# Patient Record
Sex: MALE | Race: WHITE | Employment: OTHER
[De-identification: names, ages, dates, MRNs, and addresses within clinical notes are randomized per-mention and may not be internally consistent; named-entity substitution may affect disease eponyms.]

---

## 2024-04-22 ENCOUNTER — HOSPITAL ENCOUNTER (OUTPATIENT)
Facility: HOSPITAL | Age: 69
Discharge: HOME | End: 2024-04-25
Payer: MEDICARE

## 2024-04-22 DIAGNOSIS — D64.9 ANEMIA, UNSPECIFIED TYPE: ICD-10-CM

## 2024-04-22 DIAGNOSIS — R79.89 ELEVATED SERUM CREATININE: ICD-10-CM

## 2024-04-22 LAB
ANION GAP SERPL CALC-SCNC: 3 MMOL/L (ref 3–18)
BASOPHILS # BLD: 0 K/UL (ref 0–0.1)
BASOPHILS NFR BLD: 0 % (ref 0–2)
BUN SERPL-MCNC: 18 MG/DL (ref 7–18)
BUN/CREAT SERPL: 15 (ref 12–20)
CALCIUM SERPL-MCNC: 8.9 MG/DL (ref 8.5–10.1)
CHLORIDE SERPL-SCNC: 112 MMOL/L (ref 100–111)
CO2 SERPL-SCNC: 27 MMOL/L (ref 21–32)
CREAT SERPL-MCNC: 1.17 MG/DL (ref 0.6–1.3)
DIFFERENTIAL METHOD BLD: ABNORMAL
EOSINOPHIL # BLD: 0.1 K/UL (ref 0–0.4)
EOSINOPHIL NFR BLD: 2 % (ref 0–5)
ERYTHROCYTE [DISTWIDTH] IN BLOOD BY AUTOMATED COUNT: 16.9 % (ref 11.6–14.5)
GLUCOSE SERPL-MCNC: 100 MG/DL (ref 74–99)
HCT VFR BLD AUTO: 36.5 % (ref 36–48)
HGB BLD-MCNC: 10.9 G/DL (ref 13–16)
IMM GRANULOCYTES # BLD AUTO: 0 K/UL (ref 0–0.04)
IMM GRANULOCYTES NFR BLD AUTO: 0 % (ref 0–0.5)
LYMPHOCYTES # BLD: 0.8 K/UL (ref 0.9–3.6)
LYMPHOCYTES NFR BLD: 15 % (ref 21–52)
MCH RBC QN AUTO: 25 PG (ref 24–34)
MCHC RBC AUTO-ENTMCNC: 29.9 G/DL (ref 31–37)
MCV RBC AUTO: 83.7 FL (ref 78–100)
MONOCYTES # BLD: 0.4 K/UL (ref 0.05–1.2)
MONOCYTES NFR BLD: 9 % (ref 3–10)
NEUTS SEG # BLD: 3.8 K/UL (ref 1.8–8)
NEUTS SEG NFR BLD: 74 % (ref 40–73)
NRBC # BLD: 0 K/UL (ref 0–0.01)
NRBC BLD-RTO: 0 PER 100 WBC
PLATELET # BLD AUTO: 266 K/UL (ref 135–420)
PMV BLD AUTO: 11.9 FL (ref 9.2–11.8)
POTASSIUM SERPL-SCNC: 4.2 MMOL/L (ref 3.5–5.5)
RBC # BLD AUTO: 4.36 M/UL (ref 4.35–5.65)
SODIUM SERPL-SCNC: 142 MMOL/L (ref 136–145)
WBC # BLD AUTO: 5.1 K/UL (ref 4.6–13.2)

## 2024-04-22 PROCEDURE — 85025 COMPLETE CBC W/AUTO DIFF WBC: CPT

## 2024-04-22 PROCEDURE — 36415 COLL VENOUS BLD VENIPUNCTURE: CPT

## 2024-04-22 PROCEDURE — 80048 BASIC METABOLIC PNL TOTAL CA: CPT

## 2024-04-22 SDOH — HEALTH STABILITY: PHYSICAL HEALTH: ON AVERAGE, HOW MANY MINUTES DO YOU ENGAGE IN EXERCISE AT THIS LEVEL?: 30 MIN

## 2024-04-22 SDOH — HEALTH STABILITY: PHYSICAL HEALTH: ON AVERAGE, HOW MANY DAYS PER WEEK DO YOU ENGAGE IN MODERATE TO STRENUOUS EXERCISE (LIKE A BRISK WALK)?: 3 DAYS

## 2024-04-22 ASSESSMENT — PATIENT HEALTH QUESTIONNAIRE - PHQ9
1. LITTLE INTEREST OR PLEASURE IN DOING THINGS: NOT AT ALL
SUM OF ALL RESPONSES TO PHQ QUESTIONS 1-9: 0
SUM OF ALL RESPONSES TO PHQ9 QUESTIONS 1 & 2: 0
2. FEELING DOWN, DEPRESSED OR HOPELESS: NOT AT ALL
SUM OF ALL RESPONSES TO PHQ QUESTIONS 1-9: 0

## 2024-04-22 ASSESSMENT — LIFESTYLE VARIABLES
HOW OFTEN DO YOU HAVE A DRINK CONTAINING ALCOHOL: MONTHLY OR LESS
HOW OFTEN DO YOU HAVE A DRINK CONTAINING ALCOHOL: 2
HOW MANY STANDARD DRINKS CONTAINING ALCOHOL DO YOU HAVE ON A TYPICAL DAY: 1 OR 2
HOW MANY STANDARD DRINKS CONTAINING ALCOHOL DO YOU HAVE ON A TYPICAL DAY: 1
HOW OFTEN DO YOU HAVE SIX OR MORE DRINKS ON ONE OCCASION: 1

## 2024-04-25 ENCOUNTER — OFFICE VISIT (OUTPATIENT)
Age: 69
End: 2024-04-25

## 2024-04-25 ENCOUNTER — OFFICE VISIT (OUTPATIENT)
Age: 69
End: 2024-04-25
Payer: MEDICARE

## 2024-04-25 VITALS
SYSTOLIC BLOOD PRESSURE: 126 MMHG | OXYGEN SATURATION: 99 % | TEMPERATURE: 98.6 F | HEART RATE: 78 BPM | BODY MASS INDEX: 21.05 KG/M2 | OXYGEN SATURATION: 99 % | DIASTOLIC BLOOD PRESSURE: 76 MMHG | HEART RATE: 78 BPM | HEIGHT: 66 IN | SYSTOLIC BLOOD PRESSURE: 126 MMHG | TEMPERATURE: 98.6 F | WEIGHT: 131 LBS | RESPIRATION RATE: 16 BRPM | HEIGHT: 66 IN | DIASTOLIC BLOOD PRESSURE: 76 MMHG | WEIGHT: 131 LBS | BODY MASS INDEX: 21.05 KG/M2 | RESPIRATION RATE: 16 BRPM

## 2024-04-25 DIAGNOSIS — I10 ESSENTIAL (PRIMARY) HYPERTENSION: ICD-10-CM

## 2024-04-25 DIAGNOSIS — C18.9 MUCINOUS ADENOCARCINOMA OF COLON (HCC): ICD-10-CM

## 2024-04-25 DIAGNOSIS — R73.01 IFG (IMPAIRED FASTING GLUCOSE): ICD-10-CM

## 2024-04-25 DIAGNOSIS — Z43.6 ATTENTION TO UROSTOMY (HCC): ICD-10-CM

## 2024-04-25 DIAGNOSIS — Z71.89 ACP (ADVANCE CARE PLANNING): ICD-10-CM

## 2024-04-25 DIAGNOSIS — G62.9 NEUROPATHY: ICD-10-CM

## 2024-04-25 DIAGNOSIS — Z00.00 MEDICARE ANNUAL WELLNESS VISIT, SUBSEQUENT: Primary | ICD-10-CM

## 2024-04-25 DIAGNOSIS — Z93.3 COLOSTOMY STATUS (HCC): ICD-10-CM

## 2024-04-25 DIAGNOSIS — G45.3 AMAUROSIS FUGAX: Primary | ICD-10-CM

## 2024-04-25 DIAGNOSIS — F51.01 PRIMARY INSOMNIA: ICD-10-CM

## 2024-04-25 LAB — HBA1C MFR BLD: 5.3 %

## 2024-04-25 PROCEDURE — 3017F COLORECTAL CA SCREEN DOC REV: CPT | Performed by: FAMILY MEDICINE

## 2024-04-25 PROCEDURE — 1123F ACP DISCUSS/DSCN MKR DOCD: CPT | Performed by: FAMILY MEDICINE

## 2024-04-25 PROCEDURE — 99497 ADVNCD CARE PLAN 30 MIN: CPT | Performed by: FAMILY MEDICINE

## 2024-04-25 PROCEDURE — 3078F DIAST BP <80 MM HG: CPT | Performed by: FAMILY MEDICINE

## 2024-04-25 PROCEDURE — 3074F SYST BP LT 130 MM HG: CPT | Performed by: FAMILY MEDICINE

## 2024-04-25 PROCEDURE — G0439 PPPS, SUBSEQ VISIT: HCPCS | Performed by: FAMILY MEDICINE

## 2024-04-25 SDOH — ECONOMIC STABILITY: FOOD INSECURITY: WITHIN THE PAST 12 MONTHS, THE FOOD YOU BOUGHT JUST DIDN'T LAST AND YOU DIDN'T HAVE MONEY TO GET MORE.: NEVER TRUE

## 2024-04-25 SDOH — ECONOMIC STABILITY: INCOME INSECURITY: HOW HARD IS IT FOR YOU TO PAY FOR THE VERY BASICS LIKE FOOD, HOUSING, MEDICAL CARE, AND HEATING?: NOT HARD AT ALL

## 2024-04-25 SDOH — ECONOMIC STABILITY: HOUSING INSECURITY
IN THE LAST 12 MONTHS, WAS THERE A TIME WHEN YOU DID NOT HAVE A STEADY PLACE TO SLEEP OR SLEPT IN A SHELTER (INCLUDING NOW)?: NO

## 2024-04-25 SDOH — ECONOMIC STABILITY: FOOD INSECURITY: WITHIN THE PAST 12 MONTHS, YOU WORRIED THAT YOUR FOOD WOULD RUN OUT BEFORE YOU GOT MONEY TO BUY MORE.: NEVER TRUE

## 2024-04-25 NOTE — ACP (ADVANCE CARE PLANNING)
Advance Care Planning     General Advance Care Planning (ACP) Conversation      Date of Conversation: 4/25/2024  Conducted with: Patient with Decision Making Capacity    Healthcare Decision Maker:     Primary Decision Maker: Virginie Samayoa - Spouse - [phone]    Secondary Decision Maker: Yao RICCI,Dez COHEN - Child - [phone]    Today we documented Decision Maker(s) consistent with ACP documents on file.    Content/Action Overview:   Has ACP document(s) on file - reflects the patient's care preferences  No changes endorsed.     Length of Voluntary ACP Conversation in minutes:  16 minutes    Itz Mathias MD

## 2024-04-25 NOTE — PROGRESS NOTES
Chief Complaint   Patient presents with    Crohn's Disease    Gastroesophageal Reflux    Hypertension    Insomnia    Other      \"Have you been to the ER, urgent care clinic since your last visit?  Hospitalized since your last visit?\"    Yes, multiple ED visit for sepsis, nephrostomy complication    “Have you seen or consulted any other health care providers outside of [facility] since your last visit?”    Yes, under the care of general surgery and gastro        “Have you had a colorectal cancer screening such as a colonoscopy/FIT/Cologuard?    NO under the care of gastro due to history of adenocarcinoma of rectum /crohn's disease    Date of last Colonoscopy: 4/21/2020  No cologuard on file  No FIT/FOBT on file   Date of last flexible sigmoidoscopy: 4/21/2020          Click Here for Release of Records Request   
mmol/L 3   Est, Glom Filt Rate >60 ml/min/1.73m2 67   Glucose, Random 74 - 99 mg/dL 100 (H)   Calcium, Total 8.5 - 10.1 MG/DL 8.9   WBC 4.6 - 13.2 K/uL 5.1   RBC 4.35 - 5.65 M/uL 4.36   Hemoglobin Quant 13.0 - 16.0 g/dL 10.9 (L)   Hematocrit 36.0 - 48.0 % 36.5   MCV 78.0 - 100.0 FL 83.7   MCH 24.0 - 34.0 PG 25.0   MCHC 31.0 - 37.0 g/dL 29.9 (L)   MPV 9.2 - 11.8 FL 11.9 (H)   RDW 11.6 - 14.5 % 16.9 (H)   Platelet Count 135 - 420 K/uL 266   Neutrophils/100 leukocytes 40 - 73 % 74 (H)   Lymphocyte % 21 - 52 % 15 (L)   Monocytes % 3 - 10 % 9   Eosinophils % 0 - 5 % 2   Basophils % 0 - 2 % 0   Neutrophils Absolute 1.8 - 8.0 K/UL 3.8   Lymphocytes Absolute 0.9 - 3.6 K/UL 0.8 (L)   Monocytes Absolute 0.05 - 1.2 K/UL 0.4   Eosinophils Absolute 0.0 - 0.4 K/UL 0.1   Basophils Absolute 0.0 - 0.1 K/UL 0.0   Differential Type -   AUTOMATED   Immature Granulocytes % 0.0 - 0.5 % 0   Nucleated Red Blood Cells 0  WBC  0.00 - 0.01 K/uL 0.0  0.00   Immature Granulocytes Absolute 0.00 - 0.04 K/UL 0.0   (H): Data is abnormally high  (L): Data is abnormally low     Latest Reference Range & Units 04/25/24 11:31   Hemoglobin A1C, POC % 5.3       ASSESSMENT / PLAN   Diagnosis Orders   1. Amaurosis fugax        2. IFG (impaired fasting glucose)  AMB POC HEMOGLOBIN A1C      3. Essential (primary) hypertension  Hepatic Function Panel    Lipid Panel      4. Primary insomnia        5. Colostomy status (HCC)        6. Attention to urostomy (HCC)        7. Mucinous adenocarcinoma of colon (HCC)        8. Neuropathy          Amaurosis fugax  - cont Lipitor and ASA.  - reviewed hospital testing which was neg for stroke    IFG  - A1c is normal.    Essential hypertension  - controlled   - cont Inderal 60mg SR daily.      Insomnia   - refills of trazodone as needed.    Mucinous adenocarcinoma of colon  Colostomy state  Urostomy complication   - Follow up with GI and oncology  - cont current care of urostomy and colostomy  - seeing urology for

## 2024-04-25 NOTE — PATIENT INSTRUCTIONS
Patient Education        Well Visit, Over 65: Care Instructions  Well visits can help you stay healthy. Your doctor has checked your overall health and may have suggested ways to take good care of yourself. Your doctor also may have recommended tests. You can help prevent illness with healthy eating, good sleep, vaccinations, regular exercise, and other steps.    Get the tests that you and your doctor decide on. Depending on your age and risks, examples might include hearing tests as well as screening for colon, breast, and lung cancer. Screening helps find diseases before any symptoms appear.   Eat healthy foods. Choose fruits, vegetables, whole grains, lean protein, and low-fat dairy foods. Limit saturated fat, and reduce salt.     Limit alcohol. Men should have no more than 2 drinks a day. Women should have no more than 1. For some people, no alcohol is the best choice.   Exercise. It can help prevent falls. Get at least 30 minutes of exercise on most days of the week. Walking, yoga, and stone chi can be good choices.     Reach and stay at your healthy weight. This will lower your risk for many health problems.   Take care of your mental health. Try to stay connected with friends, family, and community, and find ways to manage stress.     If you're feeling depressed or hopeless, talk to someone. A counselor can help. If you don't have a counselor, talk to your doctor.   Talk to your doctor if you think you may have a problem with alcohol or drug use. This includes prescription medicines and illegal drugs.     Avoid tobacco and nicotine: Don't smoke, vape, or chew. If you need help quitting, talk to your doctor.   Practice safer sex. Getting tested, using condoms or dental dams, and limiting sex partners can help prevent STIs.     Make an advance directive. This is a legal way to tell your family and doctor what you want to happen at the end of your life or when you can't speak for yourself.   Prevent problems where

## 2024-04-25 NOTE — PATIENT INSTRUCTIONS

## 2024-04-25 NOTE — PROGRESS NOTES
Chief Complaint   Patient presents with    Medicare AWV     Medicare Annual Wellness Visit    Dez Samayoa is here for Medicare AWV    Assessment & Plan   Medicare annual wellness visit, subsequent  ACP (advance care planning)  -     [place] Advanced Care Planning (16-30 minutes) [11865]  Recommendations for Preventive Services Due: see orders and patient instructions/AVS.  Recommended screening schedule for the next 5-10 years is provided to the patient in written form: see Patient Instructions/AVS.     Age and sex specific counseling.       Subjective       Patient's complete Health Risk Assessment and screening values have been reviewed and are found in Flowsheets. The following problems were reviewed today and where indicated follow up appointments were made and/or referrals ordered.    Positive Risk Factor Screenings with Interventions:    Fall Risk:  Do you feel unsteady or are you worried about falling? : (!) yes  2 or more falls in past year?: no  Fall with injury in past year?: no     Interventions:    Reviewed medications, home hazards, visual acuity, and co-morbidities that can increase risk for falls  See AVS for additional education material         Controlled Medication Review:      Today's Pain Level: Pain Score: Zero     Opioid Risk: (Low risk score <55) Opioid risk score: 44    Patient is low risk for opioid use disorder or overdose.    Last PDMP Emmaneul as Reviewed:  Review User Review Instant Review Result                  General HRA Questions:  Select all that apply: (!) New or Increased Pain, Stress    Pain Interventions:  No additional interventions besides managing his gabapentin.    Stress Interventions:  Patient comments: doing well overall.  Keeping a positive outlook.  Trying to get back to activities to decrease stress.       Activity, Diet, and Weight:  On average, how many days per week do you engage in moderate to strenuous exercise (like a brisk walk)?: 3 days  On average, how many

## 2024-04-25 NOTE — PROGRESS NOTES
Chief Complaint   Patient presents with    Medicare AWV      \"Have you been to the ER, urgent care clinic since your last visit?  Hospitalized since your last visit?\"    Yes, multiple ED visit for sepsis, nephrostomy complication    “Have you seen or consulted any other health care providers outside of [facility] since your last visit?”    Yes, under the care of general surgery and gastro        “Have you had a colorectal cancer screening such as a colonoscopy/FIT/Cologuard?    NO under the care of gastro due to history of adenocarcinoma of rectum /crohn's disease    Date of last Colonoscopy: 4/21/2020  No cologuard on file  No FIT/FOBT on file   Date of last flexible sigmoidoscopy: 4/21/2020         Click Here for Release of Records Request

## 2024-05-08 DIAGNOSIS — G62.9 NEUROPATHY: ICD-10-CM

## 2024-05-09 RX ORDER — GABAPENTIN 300 MG/1
300 CAPSULE ORAL 3 TIMES DAILY
Qty: 90 CAPSULE | Refills: 5 | Status: SHIPPED | OUTPATIENT
Start: 2024-05-09 | End: 2024-11-05

## 2024-05-28 ENCOUNTER — PATIENT MESSAGE (OUTPATIENT)
Age: 69
End: 2024-05-28

## 2024-05-28 DIAGNOSIS — R20.2 LEG PARESTHESIA: Primary | ICD-10-CM

## 2024-05-28 NOTE — TELEPHONE ENCOUNTER
From: Dez Samayoa  To: Dr. Itz Mathias  Sent: 5/28/2024 11:52 AM EDT  Subject: Neuropathy specialist?    Dr. Mathias,     I would like to explore anything that might help improve the numbness and tingling in my left leg caused by sciatic nerve damage inflicted during my cancer surgery at [facility] in [place]. Is there more you and I could talk about or could you refer a specialist that could help? Virginie has seen Dr. Moura here in [facility] but I don't know his specialty.    Thank you for your help as always,  Beka Samayoa  [phone]  [email or web address]

## 2024-06-20 RX ORDER — PROPRANOLOL HCL 60 MG
60 CAPSULE, EXTENDED RELEASE 24HR ORAL DAILY
Qty: 90 CAPSULE | Refills: 2 | Status: SHIPPED | OUTPATIENT
Start: 2024-06-20

## 2024-07-11 RX ORDER — TRAZODONE HYDROCHLORIDE 100 MG/1
TABLET ORAL
Qty: 90 TABLET | Refills: 1 | Status: SHIPPED | OUTPATIENT
Start: 2024-07-11

## 2024-07-14 SDOH — HEALTH STABILITY: PHYSICAL HEALTH: ON AVERAGE, HOW MANY MINUTES DO YOU ENGAGE IN EXERCISE AT THIS LEVEL?: 30 MIN

## 2024-07-14 SDOH — HEALTH STABILITY: PHYSICAL HEALTH: ON AVERAGE, HOW MANY DAYS PER WEEK DO YOU ENGAGE IN MODERATE TO STRENUOUS EXERCISE (LIKE A BRISK WALK)?: 4 DAYS

## 2024-07-15 NOTE — PROGRESS NOTES
[facility]  [address] [place] [address], [address]  [place] [address]  Phone: [phone]  Fax: [phone]        Dez Samayoa  : 1955  PCP: Itz Mathias MD  2024    NEW PATIENT    HISTORY OF PRESENT ILLNESS  Dez Samayoa is a 68 y.o. male c/o numbness/tingling left leg onset after AP resection 10/2023 and IR ablation nicked the sciatic nerve. Pt notes he has had multiple abdominal surgery and had his bladder removed. Pt notes he has colostomy and urostomy bag. Pt has knee pain with squats. Pt started Gabapentin 300mg TID with minimal benefit. Pt continues with HEP and walking for exercise. Pt can only use elliptical for 5-10 minutes due to knee pain.    Lumbosacral XR dated 24 was reviewed. Upon my read, Normal alignment. Disc spaces intact. No focal disc space narrowing.. Pelvic obliquity. Mild posterolisthesis L2 on L3 and L3 on L4.    Pain Score: 4/10     PmHx: hx of mucinous adenocarcinoma surgery, chemo. DVT, GERD, Chron's,       reviewed.    REVIEW OF SYSTEMS  Review of Systems   Constitutional:  Negative for fever and unexpected weight change.   HENT:  Negative for trouble swallowing.    Eyes:  Negative for visual disturbance.   Respiratory:  Negative for shortness of breath.    Cardiovascular:  Negative for chest pain and palpitations.   Gastrointestinal:  Negative for diarrhea, nausea and vomiting.   Genitourinary:  Negative for enuresis.   Musculoskeletal:  Positive for back pain.   Skin:  Negative for rash.   Neurological:  Negative for dizziness, weakness and headaches.   Psychiatric/Behavioral:  Negative for sleep disturbance. The patient is not nervous/anxious.      SPINE/MUSCULOSKELETAL EXAM  Back Exam     Range of Motion   Extension:  normal   Flexion:  normal   Rotation right:  normal   Rotation left:  normal     Tests   Straight leg raise right: negative  Straight leg raise left: negative    Reflexes   Patellar:  2/4  Achilles:

## 2024-07-17 ENCOUNTER — OFFICE VISIT (OUTPATIENT)
Age: 69
End: 2024-07-17
Payer: MEDICARE

## 2024-07-17 DIAGNOSIS — R20.0 NUMBNESS AND TINGLING OF LEFT LOWER EXTREMITY: ICD-10-CM

## 2024-07-17 DIAGNOSIS — M54.50 LUMBAR PAIN: ICD-10-CM

## 2024-07-17 DIAGNOSIS — R20.2 NUMBNESS AND TINGLING OF LEFT LOWER EXTREMITY: ICD-10-CM

## 2024-07-17 DIAGNOSIS — M54.16 LUMBAR RADICULOPATHY: Primary | ICD-10-CM

## 2024-07-17 PROCEDURE — 99204 OFFICE O/P NEW MOD 45 MIN: CPT | Performed by: PHYSICAL MEDICINE & REHABILITATION

## 2024-07-17 PROCEDURE — G8420 CALC BMI NORM PARAMETERS: HCPCS | Performed by: PHYSICAL MEDICINE & REHABILITATION

## 2024-07-17 PROCEDURE — 72100 X-RAY EXAM L-S SPINE 2/3 VWS: CPT | Performed by: PHYSICAL MEDICINE & REHABILITATION

## 2024-07-17 PROCEDURE — 1036F TOBACCO NON-USER: CPT | Performed by: PHYSICAL MEDICINE & REHABILITATION

## 2024-07-17 PROCEDURE — G8427 DOCREV CUR MEDS BY ELIG CLIN: HCPCS | Performed by: PHYSICAL MEDICINE & REHABILITATION

## 2024-07-17 PROCEDURE — 1123F ACP DISCUSS/DSCN MKR DOCD: CPT | Performed by: PHYSICAL MEDICINE & REHABILITATION

## 2024-07-17 PROCEDURE — 3017F COLORECTAL CA SCREEN DOC REV: CPT | Performed by: PHYSICAL MEDICINE & REHABILITATION

## 2024-07-17 RX ORDER — GABAPENTIN 600 MG/1
600 TABLET ORAL 3 TIMES DAILY
Qty: 270 TABLET | Refills: 1 | Status: SHIPPED | OUTPATIENT
Start: 2024-07-17 | End: 2025-01-13

## 2024-07-17 ASSESSMENT — ENCOUNTER SYMPTOMS
DIARRHEA: 0
NAUSEA: 0
VOMITING: 0
TROUBLE SWALLOWING: 0
SHORTNESS OF BREATH: 0
BACK PAIN: 1

## 2024-07-18 ENCOUNTER — TELEPHONE (OUTPATIENT)
Age: 69
End: 2024-07-18

## 2024-07-18 NOTE — TELEPHONE ENCOUNTER
I called and spoke to the pt. The pt was identified using 2 pt identifiers. He was asked how long he was on the elliptical. He reports being on the elliptical for 20 minutes. This is longer than he is used to, according to his OV note from 07/17/24. I asked him when the pain started. He confirmed that the pain came after he got off the elliptical and after he took the extra gabapentin. The pt then went on to mention that he has a fever of 101.7. I explained to the pt that this can cause his nausea. The pt has a urostomy and there is some concern for an infection based on the location of his pain. I did inform Dr. Avery of this and the pt's reported symptoms. He states that the pt needs to either go to the ER or urgent care to make sure he does not have an infection in his kidneys. I notified the pt of Dr. Avery's recommendation. He verbalized understanding and has no questions at this time.

## 2024-07-18 NOTE — TELEPHONE ENCOUNTER
Patient called today, states he took his new recommended double dose of his Gabapentin yesterday afternoon and worked out on the eliptical. States he is having nausea and severe pain around his right kidney area, that has turned into more of a constant throbbing pain. He did not take a dose last night or this morning due to the nausea and pain.    Please review and advise patient, [phone]

## 2024-07-26 ENCOUNTER — HOSPITAL ENCOUNTER (OUTPATIENT)
Facility: HOSPITAL | Age: 69
End: 2024-07-26
Attending: PHYSICAL MEDICINE & REHABILITATION
Payer: MEDICARE

## 2024-07-26 DIAGNOSIS — R20.2 NUMBNESS AND TINGLING OF LEFT LOWER EXTREMITY: ICD-10-CM

## 2024-07-26 DIAGNOSIS — M54.16 LUMBAR RADICULOPATHY: ICD-10-CM

## 2024-07-26 DIAGNOSIS — M54.50 LUMBAR PAIN: ICD-10-CM

## 2024-07-26 DIAGNOSIS — R20.0 NUMBNESS AND TINGLING OF LEFT LOWER EXTREMITY: ICD-10-CM

## 2024-07-26 PROCEDURE — 6360000004 HC RX CONTRAST MEDICATION: Performed by: PHYSICAL MEDICINE & REHABILITATION

## 2024-07-26 PROCEDURE — 72158 MRI LUMBAR SPINE W/O & W/DYE: CPT

## 2024-07-26 PROCEDURE — A9577 INJ MULTIHANCE: HCPCS | Performed by: PHYSICAL MEDICINE & REHABILITATION

## 2024-07-26 RX ADMIN — GADOBENATE DIMEGLUMINE 13 ML: 529 INJECTION, SOLUTION INTRAVENOUS at 17:38

## 2024-07-29 ENCOUNTER — TELEPHONE (OUTPATIENT)
Age: 69
End: 2024-07-29

## 2024-07-29 NOTE — TELEPHONE ENCOUNTER
Patient called for  and said that he had the mri of the Lower Lumbar done on 7/26/24 at [facility].     Patient is asking if their is anyway  could work him in to be seen this week. That he has the mri disc.     Patient  tel. [phone].

## 2024-07-29 NOTE — TELEPHONE ENCOUNTER
As of right now the MRI Lumbar spine has not been read by the radiologist. We can keep an eye out for the results and we will try to get him in sooner.

## 2024-08-02 NOTE — TELEPHONE ENCOUNTER
Called patient [phone] and verified his name and date of birth. I informed him that I will monitor Dr. Avery schedule for any cancellations next and I will give him a call. I inquired how much time notice would he need for an appointment. He stated he would need a couple of hours. I informed him that I will call once I get an opening. Patient verbalized understanding.

## 2024-08-05 NOTE — TELEPHONE ENCOUNTER
Called patient [phone] and verified his name and date of birth. I offered the patient an appointment for Wednesday at 1045 am with Dr. Avery. Patient accepted and appointment was made. No further action needed at this time.

## 2024-08-06 ENCOUNTER — HOSPITAL ENCOUNTER (OUTPATIENT)
Facility: HOSPITAL | Age: 69
Discharge: HOME | End: 2024-08-09
Payer: MEDICARE

## 2024-08-06 DIAGNOSIS — I10 ESSENTIAL (PRIMARY) HYPERTENSION: ICD-10-CM

## 2024-08-06 LAB
ALBUMIN SERPL-MCNC: 3.5 G/DL (ref 3.4–5)
ALBUMIN/GLOB SERPL: 0.9 (ref 0.8–1.7)
ALP SERPL-CCNC: 77 U/L (ref 45–117)
ALT SERPL-CCNC: 44 U/L (ref 16–61)
AST SERPL-CCNC: 20 U/L (ref 10–38)
BILIRUB DIRECT SERPL-MCNC: 0.2 MG/DL (ref 0–0.2)
BILIRUB SERPL-MCNC: 0.7 MG/DL (ref 0.2–1)
CHOLEST SERPL-MCNC: 80 MG/DL
GLOBULIN SER CALC-MCNC: 4.1 G/DL (ref 2–4)
HDLC SERPL-MCNC: 26 MG/DL (ref 40–60)
HDLC SERPL: 3.1 (ref 0–5)
INR PPP: 1.1 (ref 0.9–1.1)
LDLC SERPL CALC-MCNC: 23.2 MG/DL (ref 0–100)
LIPID PANEL: ABNORMAL
PROT SERPL-MCNC: 7.6 G/DL (ref 6.4–8.2)
PROTHROMBIN TIME: 14.2 SEC (ref 11.9–14.9)
TRIGL SERPL-MCNC: 154 MG/DL
VLDLC SERPL CALC-MCNC: 30.8 MG/DL

## 2024-08-06 PROCEDURE — 80076 HEPATIC FUNCTION PANEL: CPT

## 2024-08-06 PROCEDURE — 36415 COLL VENOUS BLD VENIPUNCTURE: CPT

## 2024-08-06 PROCEDURE — 80061 LIPID PANEL: CPT

## 2024-08-06 PROCEDURE — 85610 PROTHROMBIN TIME: CPT

## 2024-08-06 NOTE — PROGRESS NOTES
[facility]  [facility] [facility], [address]  [place] [address]  Phone: [phone]  Fax: [phone]      Dez Samayoa  : 1955  PCP: Itz Mathias MD  2024    PROGRESS NOTE    HISTORY OF PRESENT ILLNESS    24  C/o numbness/tingling left leg onset after AP resection 10/2023 and IR ablation nicked the sciatic nerve.   Pt notes he has had multiple abdominal surgery and had his bladder removed and currently has a colostomy and urostomy bag.   Pt has knee pain with squats.   Pt started Gabapentin 300mg TID with minimal benefit.   Pt continues with HEP and walking for exercise.   Pt can only use elliptical for 5-10 minutes due to knee pain.  Lumbosacral XR dated 24 was reviewed. Upon my read, Normal alignment. Disc spaces intact. No focal disc space narrowing.. Pelvic obliquity. Mild posterolisthesis L2 on L3 and L3 on L4.  PLAN:  Gabapentin to 600mg TID; Lumbar MRI w/ contrast    Dez Samayoa is a 68 y.o. male was seen today for follow up. Pt notes he had muscle flap completed by Wilfrido Diaz MD, [facility]. Pt regularly walks around 1 miles. Pt notes he has been able to play golf but has decreased performance with fatigue.     Lumbar MRI images dated 24 were reviewed. Per report, Bilateral sacral nerve roots coursing through what appears to be presacral soft tissue inflammatory scarring changes; secondary sacral nerve root impingement with caliber change. Residual/recurrent presacral neoplastic process not excluded. Lumbar spondylosis as described without high-grade central canal or foraminal narrowing. Fatty marrow replacement of sacral elements; suspect post radiation changes.      Pain Score:   10    PmHx: hx of mucinous adenocarcinoma surgery, chemo. DVT, GERD, Chron's,     reviewed.    REVIEW OF SYSTEMS  Review of Systems   Constitutional:  Negative for fever and unexpected weight change.   HENT:  Negative for trouble

## 2024-08-07 ENCOUNTER — OFFICE VISIT (OUTPATIENT)
Age: 69
End: 2024-08-07
Payer: MEDICARE

## 2024-08-07 VITALS
BODY MASS INDEX: 20.89 KG/M2 | HEIGHT: 66 IN | HEART RATE: 77 BPM | TEMPERATURE: 98.1 F | WEIGHT: 130 LBS | OXYGEN SATURATION: 98 % | DIASTOLIC BLOOD PRESSURE: 76 MMHG | SYSTOLIC BLOOD PRESSURE: 120 MMHG

## 2024-08-07 DIAGNOSIS — M54.17 LUMBOSACRAL NEURITIS: Primary | ICD-10-CM

## 2024-08-07 DIAGNOSIS — R20.0 NUMBNESS AND TINGLING OF LEFT LOWER EXTREMITY: ICD-10-CM

## 2024-08-07 DIAGNOSIS — M54.16 LUMBAR RADICULOPATHY: ICD-10-CM

## 2024-08-07 DIAGNOSIS — M54.50 LUMBAR PAIN: ICD-10-CM

## 2024-08-07 DIAGNOSIS — R20.2 NUMBNESS AND TINGLING OF LEFT LOWER EXTREMITY: ICD-10-CM

## 2024-08-07 PROCEDURE — 3074F SYST BP LT 130 MM HG: CPT | Performed by: PHYSICAL MEDICINE & REHABILITATION

## 2024-08-07 PROCEDURE — 1123F ACP DISCUSS/DSCN MKR DOCD: CPT | Performed by: PHYSICAL MEDICINE & REHABILITATION

## 2024-08-07 PROCEDURE — 99214 OFFICE O/P EST MOD 30 MIN: CPT | Performed by: PHYSICAL MEDICINE & REHABILITATION

## 2024-08-07 PROCEDURE — G8420 CALC BMI NORM PARAMETERS: HCPCS | Performed by: PHYSICAL MEDICINE & REHABILITATION

## 2024-08-07 PROCEDURE — G8427 DOCREV CUR MEDS BY ELIG CLIN: HCPCS | Performed by: PHYSICAL MEDICINE & REHABILITATION

## 2024-08-07 PROCEDURE — 3017F COLORECTAL CA SCREEN DOC REV: CPT | Performed by: PHYSICAL MEDICINE & REHABILITATION

## 2024-08-07 PROCEDURE — 3078F DIAST BP <80 MM HG: CPT | Performed by: PHYSICAL MEDICINE & REHABILITATION

## 2024-08-07 PROCEDURE — 1036F TOBACCO NON-USER: CPT | Performed by: PHYSICAL MEDICINE & REHABILITATION

## 2024-08-07 ASSESSMENT — ENCOUNTER SYMPTOMS
DIARRHEA: 0
TROUBLE SWALLOWING: 0
BACK PAIN: 1
NAUSEA: 0
VOMITING: 0
SHORTNESS OF BREATH: 0

## 2024-08-07 NOTE — PROGRESS NOTES
Dez Samayoa presents today for   Chief Complaint   Patient presents with    Follow-up     FOLLOW UP AND MRI RESULTS       Is someone accompanying this pt? YES    Is the patient using any DME equipment during OV? NO    Depression Screenin/22/2024     1:09 PM 2024    10:52 AM 2023    11:06 AM 2022     9:20 AM 10/18/2021    10:10 AM 2021     1:51 PM 2021    10:13 AM   PHQ-9 Questionaire   Little interest or pleasure in doing things 0 0 0 0 0 0 0   Feeling down, depressed, or hopeless 0 0 0 0 0 0 0   Trouble falling or staying asleep, or sleeping too much   0       Feeling tired or having little energy   0       Poor appetite or overeating   0       Feeling bad about yourself - or that you are a failure or have let yourself or your family down   0       Trouble concentrating on things, such as reading the newspaper or watching television   0       Moving or speaking so slowly that other people could have noticed. Or the opposite - being so fidgety or restless that you have been moving around a lot more than usual   0       Thoughts that you would be better off dead, or of hurting yourself in some way   0       PHQ-9 Total Score 0 0 0 0 0 0 0   If you checked off any problems, how difficult have these problems made it for you to do your work, take care of things at home, or get along with other people?   0             2024     1:09 PM 2024    10:52 AM 2023    11:06 AM 2022     9:20 AM 10/18/2021    10:10 AM 2021     1:51 PM 2021    10:13 AM   PHQ Scores   PHQ2 Score 0 0 0 0 0 0 0   PHQ2 Score    0 0 0 0   PHQ9 Score 0 0 0 0 0 0 0       Learning Assessment:  No question data found.    Abuse Screenin/25/2024    10:00 AM 2024    10:00 AM 2023    11:00 AM   AMB Abuse Screening   Do you ever feel afraid of your partner? N N N   Are you in a relationship with someone who physically or mentally threatens you? N N N   Is it safe for you to go

## 2024-08-08 RX ORDER — ATORVASTATIN CALCIUM 40 MG/1
40 TABLET, FILM COATED ORAL NIGHTLY
Qty: 90 TABLET | Refills: 3 | Status: SHIPPED | OUTPATIENT
Start: 2024-08-08

## 2024-08-25 RX ORDER — ASPIRIN 81 MG/1
TABLET, CHEWABLE ORAL
Qty: 90 TABLET | Refills: 2 | Status: SHIPPED | OUTPATIENT
Start: 2024-08-25

## 2024-08-25 RX ORDER — OMEPRAZOLE 20 MG/1
CAPSULE, DELAYED RELEASE ORAL
Qty: 90 CAPSULE | Refills: 3 | Status: SHIPPED | OUTPATIENT
Start: 2024-08-25

## 2024-10-08 ENCOUNTER — TELEPHONE (OUTPATIENT)
Age: 69
End: 2024-10-08

## 2024-10-08 NOTE — TELEPHONE ENCOUNTER
Left voice message on 10/8/2024 at 4:48pm for patient to call me back and schedule spine injection order on 10/1/2024 by Dr. Avery.

## 2024-10-09 ENCOUNTER — TELEPHONE (OUTPATIENT)
Facility: CLINIC | Age: 69
End: 2024-10-09

## 2024-10-09 NOTE — TELEPHONE ENCOUNTER
Patient needs DMV form MED 10 for Disabled parking placard renewal completed.   Patient states Dr. Mathias completed the original for him back in around July. I was unable to find completed form in chart. MR. Samayoa did sign and complete his portion of the form and it was put in provider's inbox for MA review. Patient aware this will be taken care of upon Dr. Mathias's return next week.     Tomeka expires on 10/26 (copy attached to paperwork)

## 2024-10-22 ENCOUNTER — HOSPITAL ENCOUNTER (OUTPATIENT)
Facility: HOSPITAL | Age: 69
Discharge: HOME | End: 2024-10-25
Payer: MEDICARE

## 2024-10-22 VITALS
TEMPERATURE: 98.2 F | OXYGEN SATURATION: 99 % | DIASTOLIC BLOOD PRESSURE: 87 MMHG | RESPIRATION RATE: 17 BRPM | SYSTOLIC BLOOD PRESSURE: 151 MMHG | HEART RATE: 77 BPM

## 2024-10-22 PROCEDURE — 6360000002 HC RX W HCPCS: Performed by: PHYSICAL MEDICINE & REHABILITATION

## 2024-10-22 PROCEDURE — 6370000000 HC RX 637 (ALT 250 FOR IP): Performed by: PHYSICAL MEDICINE & REHABILITATION

## 2024-10-22 PROCEDURE — 64999 UNLISTED PX NERVOUS SYSTEM: CPT | Performed by: PHYSICAL MEDICINE & REHABILITATION

## 2024-10-22 PROCEDURE — 64450 NJX AA&/STRD OTHER PN/BRANCH: CPT | Performed by: PHYSICAL MEDICINE & REHABILITATION

## 2024-10-22 PROCEDURE — 2500000003 HC RX 250 WO HCPCS: Performed by: PHYSICAL MEDICINE & REHABILITATION

## 2024-10-22 RX ORDER — DIAZEPAM 5 MG/1
10 TABLET ORAL ONCE
Status: COMPLETED | OUTPATIENT
Start: 2024-10-22 | End: 2024-10-22

## 2024-10-22 RX ORDER — DIAZEPAM 5 MG/1
2.5 TABLET ORAL ONCE
Status: COMPLETED | OUTPATIENT
Start: 2024-10-22 | End: 2024-10-22

## 2024-10-22 RX ORDER — DEXAMETHASONE SODIUM PHOSPHATE 10 MG/ML
10 INJECTION, SOLUTION INTRAMUSCULAR; INTRAVENOUS ONCE
Status: COMPLETED | OUTPATIENT
Start: 2024-10-22 | End: 2024-10-22

## 2024-10-22 RX ORDER — DIAZEPAM 5 MG/1
5 TABLET ORAL ONCE
Status: COMPLETED | OUTPATIENT
Start: 2024-10-22 | End: 2024-10-22

## 2024-10-22 RX ORDER — LIDOCAINE HYDROCHLORIDE 10 MG/ML
30 INJECTION, SOLUTION EPIDURAL; INFILTRATION; INTRACAUDAL; PERINEURAL ONCE
Status: COMPLETED | OUTPATIENT
Start: 2024-10-22 | End: 2024-10-22

## 2024-10-22 RX ORDER — IOPAMIDOL 408 MG/ML
4 INJECTION, SOLUTION INTRATHECAL
Status: DISCONTINUED | OUTPATIENT
Start: 2024-10-22 | End: 2024-10-26 | Stop reason: HOSPADM

## 2024-10-22 RX ADMIN — LIDOCAINE HYDROCHLORIDE 15 ML: 10 INJECTION, SOLUTION EPIDURAL; INFILTRATION; INTRACAUDAL; PERINEURAL at 16:18

## 2024-10-22 RX ADMIN — DIAZEPAM 5 MG: 5 TABLET ORAL at 15:50

## 2024-10-22 RX ADMIN — DEXAMETHASONE SODIUM PHOSPHATE 10 MG: 10 INJECTION INTRAMUSCULAR; INTRAVENOUS at 16:21

## 2024-10-22 ASSESSMENT — PAIN DESCRIPTION - DESCRIPTORS: DESCRIPTORS: ACHING;DULL;SHARP

## 2024-10-22 ASSESSMENT — PAIN SCALES - GENERAL: PAINLEVEL_OUTOF10: 3

## 2024-10-22 ASSESSMENT — PAIN - FUNCTIONAL ASSESSMENT: PAIN_FUNCTIONAL_ASSESSMENT: 0-10

## 2024-10-22 NOTE — H&P
Office Visit  2024  [facility]       Festus Avery MD  Physical Medicine and Rehab Lumbosacral neuritis +3 more  Dx Follow-up   Reason for Visit     Progress Notes  Festus Avery MD (Physician)  Physical Medicine and Rehab  Expand All Collapse All      [facility]  [address] [facility], [address]  [place] [address]  Phone: [phone]  Fax: [phone]        Dez Samayoa  : 1955  PCP: Itz Mathias MD  2024     PROGRESS NOTE     HISTORY OF PRESENT ILLNESS     24  C/o numbness/tingling left leg onset after AP resection 10/2023 and IR ablation nicked the sciatic nerve.   Pt notes he has had multiple abdominal surgery and had his bladder removed and currently has a colostomy and urostomy bag.   Pt has knee pain with squats.   Pt started Gabapentin 300mg TID with minimal benefit.   Pt continues with HEP and walking for exercise.   Pt can only use elliptical for 5-10 minutes due to knee pain.  Lumbosacral XR dated 24 was reviewed. Upon my read, Normal alignment. Disc spaces intact. No focal disc space narrowing.. Pelvic obliquity. Mild posterolisthesis L2 on L3 and L3 on L4.  PLAN:  Gabapentin to 600mg TID; Lumbar MRI w/ contrast     Dez Samayoa is a 68 y.o. male was seen today for follow up. Pt notes he had muscle flap completed by Wilfrido Diaz MD, [facility]. Pt regularly walks around 1 miles. Pt notes he has been able to play golf but has decreased performance with fatigue.      Lumbar MRI images dated 24 were reviewed. Per report, Bilateral sacral nerve roots coursing through what appears to be presacral soft tissue inflammatory scarring changes; secondary sacral nerve root impingement with caliber change. Residual/recurrent presacral neoplastic process not excluded. Lumbar spondylosis as described without high-grade central canal or foraminal narrowing. Fatty marrow replacement of

## 2024-10-22 NOTE — INTERVAL H&P NOTE
Update History & Physical    The patient's History and Physical of August 7, 2024 was reviewed.  I recently discussed this patient's MRI findings with Dr. Avery.  I wanted a follow-up study to verify that he had no evidence of malignancy in this presacral region.  He had CT abdomen and pelvis with contrast on 8/14/2024.  This shows some presacral stranding without any aggressive osseous abnormalities.  No evidence of malignancy or tumor in the presacral region.  Okay to proceed as scheduled.   The surgical site was confirmed by the patient and me.     Plan: The risks, benefits, expected outcome, and alternative to the recommended procedure have been discussed with the patient. Patient understands and wants to proceed with the procedure.     Electronically signed by VU BABIN MD on 10/22/2024 at 4:09 PM

## 2024-10-22 NOTE — PROCEDURES
PROCEDURE NOTE  Date: 10/22/2024   Name: Dez Samayoa  YOB: 1955    Procedures              Patient Name: Dez Samayoa  Date of Procedure: October 22, 2024  Preoperative Diagnosis:  Pelvic Pain  Post Operative Diagnosis:  Pelvic Pain  Location:  [place], [place]     PROCEDURE:  Ganglion of Ángel (Impar) Block under Fluoroscopy     PROCEDURE:  After obtaining written informed consent patient was taken to the procedure room.   Pre-procedure blood pressure and pulse were stable and recorded in patients clinic chart.      The patient was brought to the procedure room and placed in a prone position with a pillow support under the pelvis.      The patient back was prepped with antiseptic solution and draped in the usual sterile fashion.  The Sacrococcygeal junction was identified using fluoroscopic guidance.      Under lateral projection, skin infiltration with 3 ml of 1% Lidocaine was performed at the needle entry site,  A 22 gauge, 3 ½  inch needle was advanced perpendicular until the needle passed through the  Sacrococcygeal junction, once the tip was behind the body of the sacrum .  No paraesthesia were noted.        After negative needle aspiration,  a mixture 10 mg of preservative free Dexamethasone (10mg/ml) and 1 cc of Lidocaine 1% was slowly injected.    The needle was removed, and a 2x2 and paper tape applied.  The patient tolerated it well. There was no evidence of somatic blockade.     Following the procedure the patient's vital signs were stable. The patient was discharged home in good condition after being given discharge instructions.    Discussion: The patient tolerated the procedure well. Patient reported marsha-procedural pain on Visual Analog Scale:  pre-5; post-3.                                              VU BABIN MD  October 22, 2024

## 2024-11-26 NOTE — PROGRESS NOTES
[facility]  [facility], [address]  [place] [address]  Phone: [phone]  Fax: [phone]      Dez Samayoa  : 1955  PCP: Itz Mathias MD  2024    PROGRESS NOTE    HISTORY OF PRESENT ILLNESS    24  C/o numbness/tingling left leg onset after AP resection 10/2023 and IR ablation nicked the sciatic nerve.   Pt notes he has had multiple abdominal surgery and had his bladder removed and currently has a colostomy and urostomy bag.   Pt has knee pain with squats.   Pt started Gabapentin 300mg TID with minimal benefit.   Pt continues with HEP and walking for exercise.   Pt can only use elliptical for 5-10 minutes due to knee pain.  Lumbosacral XR dated 24 was reviewed. Upon my read, Normal alignment. Disc spaces intact. No focal disc space narrowing.. Pelvic obliquity. Mild posterolisthesis L2 on L3 and L3 on L4.  PLAN:  Gabapentin to 600mg TID; Lumbar MRI w/ contrast    24  Pt notes he had muscle flap completed by Wilfrido Diaz MD, [facility]. Pt regularly walks around 1 miles.   Pt notes he has been able to play golf but has decreased performance with fatigue.   Lumbar MRI images dated 24 were reviewed. Per report, Bilateral sacral nerve roots coursing through what appears to be presacral soft tissue inflammatory scarring changes; secondary sacral nerve root impingement with caliber change. Residual/recurrent presacral neoplastic process not excluded. Lumbar spondylosis as described without high-grade central canal or foraminal narrowing. Fatty marrow replacement of sacral elements; suspect post radiation changes.   PLAN: Ganglion Impar Block    Dez Samayoa is a 69 y.o. male was seen today for follow up. Pt underwent Ganglion of Ángel (Impar) Block (10/22/24, Dr. Dillard) with minimal benefit. Pt notes a limited walking tolerance of 1 hour. Pt notes hilly walks increased pain. Pt continues with numbness/tingling in left  0025

## 2024-12-04 ENCOUNTER — OFFICE VISIT (OUTPATIENT)
Age: 69
End: 2024-12-04
Payer: MEDICARE

## 2024-12-04 VITALS
HEART RATE: 71 BPM | WEIGHT: 138.6 LBS | TEMPERATURE: 98.2 F | RESPIRATION RATE: 16 BRPM | BODY MASS INDEX: 22.28 KG/M2 | HEIGHT: 66 IN

## 2024-12-04 DIAGNOSIS — R29.898 LEFT LEG WEAKNESS: ICD-10-CM

## 2024-12-04 DIAGNOSIS — R20.0 NUMBNESS AND TINGLING OF LEFT LOWER EXTREMITY: ICD-10-CM

## 2024-12-04 DIAGNOSIS — R20.2 NUMBNESS AND TINGLING OF LEFT LOWER EXTREMITY: ICD-10-CM

## 2024-12-04 DIAGNOSIS — M54.16 LUMBAR RADICULOPATHY: ICD-10-CM

## 2024-12-04 DIAGNOSIS — M54.17 LUMBOSACRAL NEURITIS: Primary | ICD-10-CM

## 2024-12-04 DIAGNOSIS — M54.50 LUMBAR PAIN: ICD-10-CM

## 2024-12-04 PROCEDURE — G8484 FLU IMMUNIZE NO ADMIN: HCPCS | Performed by: PHYSICAL MEDICINE & REHABILITATION

## 2024-12-04 PROCEDURE — 1123F ACP DISCUSS/DSCN MKR DOCD: CPT | Performed by: PHYSICAL MEDICINE & REHABILITATION

## 2024-12-04 PROCEDURE — 99213 OFFICE O/P EST LOW 20 MIN: CPT | Performed by: PHYSICAL MEDICINE & REHABILITATION

## 2024-12-04 PROCEDURE — 1125F AMNT PAIN NOTED PAIN PRSNT: CPT | Performed by: PHYSICAL MEDICINE & REHABILITATION

## 2024-12-04 PROCEDURE — 3017F COLORECTAL CA SCREEN DOC REV: CPT | Performed by: PHYSICAL MEDICINE & REHABILITATION

## 2024-12-04 PROCEDURE — 1160F RVW MEDS BY RX/DR IN RCRD: CPT | Performed by: PHYSICAL MEDICINE & REHABILITATION

## 2024-12-04 PROCEDURE — G8420 CALC BMI NORM PARAMETERS: HCPCS | Performed by: PHYSICAL MEDICINE & REHABILITATION

## 2024-12-04 PROCEDURE — 1159F MED LIST DOCD IN RCRD: CPT | Performed by: PHYSICAL MEDICINE & REHABILITATION

## 2024-12-04 PROCEDURE — 1036F TOBACCO NON-USER: CPT | Performed by: PHYSICAL MEDICINE & REHABILITATION

## 2024-12-04 PROCEDURE — G8427 DOCREV CUR MEDS BY ELIG CLIN: HCPCS | Performed by: PHYSICAL MEDICINE & REHABILITATION

## 2024-12-04 ASSESSMENT — ENCOUNTER SYMPTOMS
DIARRHEA: 0
SHORTNESS OF BREATH: 0
VOMITING: 0
TROUBLE SWALLOWING: 0
NAUSEA: 0
BACK PAIN: 1

## 2024-12-18 ENCOUNTER — TELEPHONE (OUTPATIENT)
Facility: CLINIC | Age: 69
End: 2024-12-18

## 2024-12-18 NOTE — TELEPHONE ENCOUNTER
Incoming Call received by [facility] spoken to Po. Mr. Dez Samayoa is having Surgery a nair exchange. Needs Dr. Mathias to hold aspirin starting on December 20 th for seven days. They will be faxing us a form

## 2025-02-03 ENCOUNTER — TELEPHONE (OUTPATIENT)
Facility: CLINIC | Age: 70
End: 2025-02-03

## 2025-02-03 SDOH — ECONOMIC STABILITY: INCOME INSECURITY: IN THE LAST 12 MONTHS, WAS THERE A TIME WHEN YOU WERE NOT ABLE TO PAY THE MORTGAGE OR RENT ON TIME?: NO

## 2025-02-03 SDOH — ECONOMIC STABILITY: FOOD INSECURITY: WITHIN THE PAST 12 MONTHS, THE FOOD YOU BOUGHT JUST DIDN'T LAST AND YOU DIDN'T HAVE MONEY TO GET MORE.: NEVER TRUE

## 2025-02-03 SDOH — ECONOMIC STABILITY: TRANSPORTATION INSECURITY
IN THE PAST 12 MONTHS, HAS THE LACK OF TRANSPORTATION KEPT YOU FROM MEDICAL APPOINTMENTS OR FROM GETTING MEDICATIONS?: NO

## 2025-02-03 SDOH — ECONOMIC STABILITY: FOOD INSECURITY: WITHIN THE PAST 12 MONTHS, YOU WORRIED THAT YOUR FOOD WOULD RUN OUT BEFORE YOU GOT MONEY TO BUY MORE.: NEVER TRUE

## 2025-02-03 SDOH — ECONOMIC STABILITY: TRANSPORTATION INSECURITY
IN THE PAST 12 MONTHS, HAS LACK OF TRANSPORTATION KEPT YOU FROM MEETINGS, WORK, OR FROM GETTING THINGS NEEDED FOR DAILY LIVING?: NO

## 2025-02-03 NOTE — TELEPHONE ENCOUNTER
Care Transitions Initial Follow Up Call    Outreach made within 2 business days of discharge: Yes    Patient: Dez Samayoa Patient : 1955   MRN: [id number]  Reason for Admission: UTI, ureteral stricture and CARLY from 2025 to 2025   Discharge Date: 2025       Spoke with: Mr. Dez Samayoa    Discharge department/facility: [facility] Interactive Patient Contact:  Was patient able to fill all prescriptions: Yes  Was patient instructed to bring all medications to the follow-up visit: Yes  Is patient taking all medications as directed in the discharge summary? Has concerns about why Eliquis 5 mg was prescribed   Does patient understand their discharge instructions: Yes  Does patient have questions or concerns that need addressed prior to 7-14 day follow up office visit: yes - No    Additional needs identified to be addressed with provider  Has concerns about why Eliquis 5 mg was prescribed (he has not picked up from the pharmacy.              Scheduled appointment with PCP within 7-14 days    Follow Up  Future Appointments   Date Time Provider Department Center   2025  9:00 AM Itz Mathias MD [facility]   3/18/2025 10:00 AM Sia Nieves MD [facility]   2025 10:15 AM Itz Mathias MD [facility]       Emilee Short

## 2025-02-04 ENCOUNTER — OFFICE VISIT (OUTPATIENT)
Facility: CLINIC | Age: 70
End: 2025-02-04

## 2025-02-04 VITALS
DIASTOLIC BLOOD PRESSURE: 80 MMHG | HEIGHT: 66 IN | BODY MASS INDEX: 22.18 KG/M2 | WEIGHT: 138 LBS | SYSTOLIC BLOOD PRESSURE: 146 MMHG | HEART RATE: 75 BPM | TEMPERATURE: 98 F | RESPIRATION RATE: 16 BRPM | OXYGEN SATURATION: 96 %

## 2025-02-04 DIAGNOSIS — R05.3 PERSISTENT COUGH: ICD-10-CM

## 2025-02-04 DIAGNOSIS — R65.20 SEPSIS WITH ACUTE ORGAN DYSFUNCTION, DUE TO UNSPECIFIED ORGANISM, UNSPECIFIED ORGAN DYSFUNCTION TYPE, UNSPECIFIED WHETHER SEPTIC SHOCK PRESENT (HCC): Primary | ICD-10-CM

## 2025-02-04 DIAGNOSIS — I82.621 ACUTE DEEP VEIN THROMBOSIS (DVT) OF RADIAL VEIN OF RIGHT UPPER EXTREMITY (HCC): ICD-10-CM

## 2025-02-04 DIAGNOSIS — R79.89 ELEVATED LFTS: ICD-10-CM

## 2025-02-04 DIAGNOSIS — Z93.3 COLOSTOMY STATUS (HCC): ICD-10-CM

## 2025-02-04 DIAGNOSIS — A41.9 SEPSIS WITH ACUTE ORGAN DYSFUNCTION, DUE TO UNSPECIFIED ORGANISM, UNSPECIFIED ORGAN DYSFUNCTION TYPE, UNSPECIFIED WHETHER SEPTIC SHOCK PRESENT (HCC): Primary | ICD-10-CM

## 2025-02-04 DIAGNOSIS — C20 RECTAL ADENOCARCINOMA (HCC): ICD-10-CM

## 2025-02-04 DIAGNOSIS — D64.9 ANEMIA, UNSPECIFIED TYPE: ICD-10-CM

## 2025-02-04 DIAGNOSIS — Z09 HOSPITAL DISCHARGE FOLLOW-UP: ICD-10-CM

## 2025-02-04 RX ORDER — HYDROCODONE POLISTIREX AND CHLORPHENIRAMINE POLISTIREX 10; 8 MG/5ML; MG/5ML
5 SUSPENSION, EXTENDED RELEASE ORAL EVERY 12 HOURS PRN
Qty: 100 ML | Refills: 0 | Status: SHIPPED | OUTPATIENT
Start: 2025-02-04 | End: 2025-02-14

## 2025-02-04 ASSESSMENT — PATIENT HEALTH QUESTIONNAIRE - PHQ9
SUM OF ALL RESPONSES TO PHQ QUESTIONS 1-9: 0
1. LITTLE INTEREST OR PLEASURE IN DOING THINGS: NOT AT ALL
SUM OF ALL RESPONSES TO PHQ QUESTIONS 1-9: 0
2. FEELING DOWN, DEPRESSED OR HOPELESS: NOT AT ALL
SUM OF ALL RESPONSES TO PHQ9 QUESTIONS 1 & 2: 0
SUM OF ALL RESPONSES TO PHQ QUESTIONS 1-9: 0
SUM OF ALL RESPONSES TO PHQ QUESTIONS 1-9: 0

## 2025-02-04 NOTE — PROGRESS NOTES
Chief Complaint   Patient presents with    Follow-Up from [facility] from 01/20/2025 to 01/30/2025 for UTI / ureteral stricture and CARLY     Cough     Ongoing past 6-7 days     Discoloration of Finger Tips      Possible Raynaud Disease       \"Have you been to the ER, urgent care clinic since your last visit?  Hospitalized since your last visit?\"    YES - When: approximately 1 months ago.  Where and Why: 01/20/2025 - 01/30/2025 for UTI, CARLY, and ureteral stricture .    “Have you seen or consulted any other health care providers outside our system since your last visit?”    NO      “Have you had a colorectal cancer screening such as a colonoscopy/FIT/Cologuard?    NO - not a candidate for colonoscopy procedures     Date of last Colonoscopy: 4/21/2020  No cologuard on file  No FIT/FOBT on file   Date of last flexible sigmoidoscopy: 4/21/2020     No updated immunization noted on [place] Immunization Registry as of 02/03/2025

## 2025-02-04 NOTE — PROGRESS NOTES
Post-Discharge Transitional Care Management Progress Note      Dez Samayoa   YOB: 1955    Date of Office Visit:  2/4/2025  Date of Hospital Admission: 1/20/2025  Date of Hospital Discharge: 1/30/2025    Care management risk score Rising risk (score 2-5) and Complex Care (Scores >=6): No Risk Score On File     Non face to face  following discharge, date last encounter closed (first attempt may have been earlier): 02/03/2025 02/03/2025    Call initiated 2 business days of discharge: Yes    ASSESSMENT/PLAN:   Sepsis with acute organ dysfunction, due to unspecified organism, unspecified organ dysfunction type, unspecified whether septic shock present (HCC)  Colostomy status (HCC)  Rectal adenocarcinoma (HCC)  Acute deep vein thrombosis (DVT) of radial vein of right upper extremity (HCC)  Hospital discharge follow-up  -     NC DISCHARGE MEDS RECONCILED W/ CURRENT OUTPATIENT MED LIST  Anemia, unspecified type  -     CBC with Auto Differential; Future  Elevated LFTs  -     Comprehensive Metabolic Panel; Future  Persistent cough  -     HYDROcodone-chlorpheniramine (TUSSIONEX) 10-8 MG/5ML SUER; Take 5 mLs by mouth every 12 hours as needed (cough) for up to 10 days. Max Daily Amount: 10 mLs, Disp-100 mL, R-0Normal    Sepsis with acute organ dysfunction - resolved.  Cont current care.    Rectal adenocarcinoma with colostomy status - cont per specialty team.  Agree with care plan.  Following along.     Anemia - following with hematology.  Check Hg to see if dropping.    Elevated Lfts - check to see returning to normal and continuing downward trend from inpatient.     Persistent cough - tussionex to calm cough down.  Patient has been on hydrocodone before and reassures me of no medication allergy.     All chart history elements were reviewed by me at the time of the visit even though marked at time of note closure. Patient understands our medical plan. Patient has provided input and agrees with goals.

## 2025-02-04 NOTE — PATIENT INSTRUCTIONS

## 2025-02-10 ENCOUNTER — HOSPITAL ENCOUNTER (OUTPATIENT)
Facility: HOSPITAL | Age: 70
Discharge: HOME | End: 2025-02-13
Payer: MEDICARE

## 2025-02-10 LAB
ALBUMIN SERPL-MCNC: 3.5 G/DL (ref 3.4–5)
ALBUMIN SERPL-MCNC: 3.5 G/DL (ref 3.4–5)
ALBUMIN/GLOB SERPL: 0.8 (ref 0.8–1.7)
ALBUMIN/GLOB SERPL: 0.8 (ref 0.8–1.7)
ALP SERPL-CCNC: 169 U/L (ref 45–117)
ALP SERPL-CCNC: 172 U/L (ref 45–117)
ALT SERPL-CCNC: 65 U/L (ref 16–61)
ALT SERPL-CCNC: 66 U/L (ref 16–61)
ANION GAP SERPL CALC-SCNC: 5 MMOL/L (ref 3–18)
AST SERPL-CCNC: 36 U/L (ref 10–38)
AST SERPL-CCNC: 36 U/L (ref 10–38)
BASOPHILS # BLD: 0.04 K/UL (ref 0–0.1)
BASOPHILS NFR BLD: 0.8 % (ref 0–2)
BILIRUB DIRECT SERPL-MCNC: 0.3 MG/DL (ref 0–0.2)
BILIRUB SERPL-MCNC: 0.5 MG/DL (ref 0.2–1)
BILIRUB SERPL-MCNC: 0.5 MG/DL (ref 0.2–1)
BUN SERPL-MCNC: 28 MG/DL (ref 7–18)
BUN/CREAT SERPL: 18 (ref 12–20)
CALCIUM SERPL-MCNC: 9.1 MG/DL (ref 8.5–10.1)
CHLORIDE SERPL-SCNC: 110 MMOL/L (ref 100–111)
CO2 SERPL-SCNC: 21 MMOL/L (ref 21–32)
CREAT SERPL-MCNC: 1.57 MG/DL (ref 0.6–1.3)
DIFFERENTIAL METHOD BLD: ABNORMAL
EOSINOPHIL # BLD: 0.13 K/UL (ref 0–0.4)
EOSINOPHIL NFR BLD: 2.6 % (ref 0–5)
ERYTHROCYTE [DISTWIDTH] IN BLOOD BY AUTOMATED COUNT: 19.4 % (ref 11.6–14.5)
GLOBULIN SER CALC-MCNC: 4.2 G/DL (ref 2–4)
GLOBULIN SER CALC-MCNC: 4.2 G/DL (ref 2–4)
GLUCOSE SERPL-MCNC: 94 MG/DL (ref 74–99)
HCT VFR BLD AUTO: 35.1 % (ref 36–48)
HGB BLD-MCNC: 10.5 G/DL (ref 13–16)
IMM GRANULOCYTES # BLD AUTO: 0.02 K/UL (ref 0–0.04)
IMM GRANULOCYTES NFR BLD AUTO: 0.4 % (ref 0–0.5)
LYMPHOCYTES # BLD: 0.92 K/UL (ref 0.9–3.6)
LYMPHOCYTES NFR BLD: 18.7 % (ref 21–52)
MCH RBC QN AUTO: 26.6 PG (ref 24–34)
MCHC RBC AUTO-ENTMCNC: 29.9 G/DL (ref 31–37)
MCV RBC AUTO: 89.1 FL (ref 78–100)
MONOCYTES # BLD: 0.65 K/UL (ref 0.05–1.2)
MONOCYTES NFR BLD: 13.2 % (ref 3–10)
NEUTS SEG # BLD: 3.17 K/UL (ref 1.8–8)
NEUTS SEG NFR BLD: 64.3 % (ref 40–73)
NRBC # BLD: 0 K/UL (ref 0–0.01)
NRBC BLD-RTO: 0 PER 100 WBC
PLATELET # BLD AUTO: 404 K/UL (ref 135–420)
PMV BLD AUTO: 11.7 FL (ref 9.2–11.8)
POTASSIUM SERPL-SCNC: 4.6 MMOL/L (ref 3.5–5.5)
PROT SERPL-MCNC: 7.7 G/DL (ref 6.4–8.2)
PROT SERPL-MCNC: 7.7 G/DL (ref 6.4–8.2)
RBC # BLD AUTO: 3.94 M/UL (ref 4.35–5.65)
SODIUM SERPL-SCNC: 136 MMOL/L (ref 136–145)
WBC # BLD AUTO: 4.9 K/UL (ref 4.6–13.2)

## 2025-02-10 PROCEDURE — 85025 COMPLETE CBC W/AUTO DIFF WBC: CPT

## 2025-02-10 PROCEDURE — 36415 COLL VENOUS BLD VENIPUNCTURE: CPT

## 2025-02-10 PROCEDURE — 80076 HEPATIC FUNCTION PANEL: CPT

## 2025-02-10 PROCEDURE — 80053 COMPREHEN METABOLIC PANEL: CPT

## 2025-02-14 DIAGNOSIS — R20.2 NUMBNESS AND TINGLING OF LEFT LOWER EXTREMITY: ICD-10-CM

## 2025-02-14 DIAGNOSIS — M54.16 LUMBAR RADICULOPATHY: ICD-10-CM

## 2025-02-14 DIAGNOSIS — R20.0 NUMBNESS AND TINGLING OF LEFT LOWER EXTREMITY: ICD-10-CM

## 2025-02-17 RX ORDER — GABAPENTIN 600 MG/1
TABLET ORAL
Qty: 270 TABLET | Refills: 1 | Status: SHIPPED | OUTPATIENT
Start: 2025-02-17 | End: 2025-08-16

## 2025-03-02 ENCOUNTER — PATIENT MESSAGE (OUTPATIENT)
Facility: CLINIC | Age: 70
End: 2025-03-02

## 2025-03-02 DIAGNOSIS — R05.3 PERSISTENT COUGH: Primary | ICD-10-CM

## 2025-03-04 ENCOUNTER — TELEPHONE (OUTPATIENT)
Facility: CLINIC | Age: 70
End: 2025-03-04

## 2025-03-04 ENCOUNTER — HOSPITAL ENCOUNTER (OUTPATIENT)
Facility: HOSPITAL | Age: 70
Discharge: HOME | End: 2025-03-07
Payer: MEDICARE

## 2025-03-04 DIAGNOSIS — R05.3 PERSISTENT COUGH: ICD-10-CM

## 2025-03-04 PROCEDURE — 71046 X-RAY EXAM CHEST 2 VIEWS: CPT

## 2025-03-04 NOTE — TELEPHONE ENCOUNTER
Pt states that he has finished the medication for his cough but he still has the cough. Please advise

## 2025-03-04 NOTE — TELEPHONE ENCOUNTER
Spoke with Mr. Samayoa to advised that Dr. Mathias has sent him a my-chart message which he needs to read and responded  back to him.

## 2025-03-07 RX ORDER — FLUTICASONE PROPIONATE AND SALMETEROL 250; 50 UG/1; UG/1
1 POWDER RESPIRATORY (INHALATION) EVERY 12 HOURS
Qty: 60 EACH | Refills: 0 | Status: SHIPPED | OUTPATIENT
Start: 2025-03-07

## 2025-03-26 RX ORDER — PROPRANOLOL HYDROCHLORIDE 60 MG/1
60 CAPSULE, EXTENDED RELEASE ORAL DAILY
Qty: 90 CAPSULE | Refills: 0 | Status: SHIPPED | OUTPATIENT
Start: 2025-03-26

## 2025-04-01 ENCOUNTER — OFFICE VISIT (OUTPATIENT)
Age: 70
End: 2025-04-01

## 2025-04-01 DIAGNOSIS — M18.12 ARTHRITIS OF CARPOMETACARPAL (CMC) JOINT OF LEFT THUMB: Primary | ICD-10-CM

## 2025-04-01 RX ORDER — TRIAMCINOLONE ACETONIDE 40 MG/ML
40 INJECTION, SUSPENSION INTRA-ARTICULAR; INTRAMUSCULAR ONCE
Status: COMPLETED | OUTPATIENT
Start: 2025-04-01 | End: 2025-04-01

## 2025-04-01 RX ADMIN — TRIAMCINOLONE ACETONIDE 40 MG: 40 INJECTION, SUSPENSION INTRA-ARTICULAR; INTRAMUSCULAR at 08:13

## 2025-04-01 NOTE — PROGRESS NOTES
Dez Samayoa  1955   Chief Complaint   Patient presents with    Hand Pain     Left Hand Pain- Thumb        HISTORY OF PRESENT ILLNESS  Dez Samayoa is a 69 y.o. male who presents today for evaluation of left hand pain.  Pain is a 9/10. Pain has been present for years. Denies any recent injury or fall. Pain is constant.  Has had recurrent problems for a long time now having difficulty with simple activities of daily living    Has tried following treatments: Injections:No; Brace:No; Therapy:No; Cane/Crutch:No      Allergies   Allergen Reactions    Hydromorphone Hives, Itching and Rash     Itching    Iodinated Contrast Media Nausea And Vomiting     Betadine okay.        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 Lewis; normal mucosa; ulceration in the rectum (biopsied)    Hep C w/o coma, chronic (HCC)     genotype 1B s/p IFN 1996 at [facility] but relapsed; Dr Agarwal 2015, fibrosure F3, s/p Harvoni w cure    History of esophagogastroduodenoscopy (EGD) 10/15/2020    Dr. 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

## 2025-04-10 NOTE — PROGRESS NOTES
Chief Complaint   Patient presents with    Medicare AWV      \"Have you been to the ER, urgent care clinic since your last visit?  Hospitalized since your last visit?\"    NO    “Have you seen or consulted any other health care providers outside our system since your last visit?”    YES, under the care of Gastro and oncology for rectal carcinoma       “Have you had a colorectal cancer screening such as a colonoscopy/FIT/Cologuard?    NO - ostomy     Date of last Colonoscopy: 4/21/2020  No cologuard on file  No FIT/FOBT on file   Date of last flexible sigmoidoscopy: 4/21/2020

## 2025-04-11 SDOH — HEALTH STABILITY: PHYSICAL HEALTH: ON AVERAGE, HOW MANY MINUTES DO YOU ENGAGE IN EXERCISE AT THIS LEVEL?: 30 MIN

## 2025-04-11 SDOH — HEALTH STABILITY: PHYSICAL HEALTH: ON AVERAGE, HOW MANY DAYS PER WEEK DO YOU ENGAGE IN MODERATE TO STRENUOUS EXERCISE (LIKE A BRISK WALK)?: 5 DAYS

## 2025-04-11 ASSESSMENT — PATIENT HEALTH QUESTIONNAIRE - PHQ9
SUM OF ALL RESPONSES TO PHQ QUESTIONS 1-9: 0
SUM OF ALL RESPONSES TO PHQ QUESTIONS 1-9: 0
1. LITTLE INTEREST OR PLEASURE IN DOING THINGS: NOT AT ALL
SUM OF ALL RESPONSES TO PHQ QUESTIONS 1-9: 0
SUM OF ALL RESPONSES TO PHQ QUESTIONS 1-9: 0
2. FEELING DOWN, DEPRESSED OR HOPELESS: NOT AT ALL

## 2025-04-11 ASSESSMENT — LIFESTYLE VARIABLES
HOW MANY STANDARD DRINKS CONTAINING ALCOHOL DO YOU HAVE ON A TYPICAL DAY: 1
HOW OFTEN DO YOU HAVE SIX OR MORE DRINKS ON ONE OCCASION: 1
HOW MANY STANDARD DRINKS CONTAINING ALCOHOL DO YOU HAVE ON A TYPICAL DAY: 1 OR 2
HOW OFTEN DO YOU HAVE A DRINK CONTAINING ALCOHOL: 2-4 TIMES A MONTH
HOW OFTEN DO YOU HAVE A DRINK CONTAINING ALCOHOL: 3

## 2025-04-14 ENCOUNTER — OFFICE VISIT (OUTPATIENT)
Facility: CLINIC | Age: 70
End: 2025-04-14
Payer: MEDICARE

## 2025-04-14 VITALS
HEIGHT: 66 IN | BODY MASS INDEX: 19.93 KG/M2 | SYSTOLIC BLOOD PRESSURE: 138 MMHG | HEART RATE: 101 BPM | OXYGEN SATURATION: 99 % | BODY MASS INDEX: 19.93 KG/M2 | WEIGHT: 124 LBS | HEART RATE: 101 BPM | TEMPERATURE: 98.5 F | SYSTOLIC BLOOD PRESSURE: 138 MMHG | DIASTOLIC BLOOD PRESSURE: 84 MMHG | WEIGHT: 124 LBS | HEIGHT: 66 IN | RESPIRATION RATE: 18 BRPM | DIASTOLIC BLOOD PRESSURE: 84 MMHG | TEMPERATURE: 98.5 F | RESPIRATION RATE: 18 BRPM | OXYGEN SATURATION: 99 %

## 2025-04-14 DIAGNOSIS — G62.9 NEUROPATHY: ICD-10-CM

## 2025-04-14 DIAGNOSIS — K21.9 GASTRO-ESOPHAGEAL REFLUX DISEASE WITHOUT ESOPHAGITIS: ICD-10-CM

## 2025-04-14 DIAGNOSIS — N18.31 STAGE 3A CHRONIC KIDNEY DISEASE (HCC): ICD-10-CM

## 2025-04-14 DIAGNOSIS — I82.621 ACUTE DEEP VEIN THROMBOSIS (DVT) OF RADIAL VEIN OF RIGHT UPPER EXTREMITY: ICD-10-CM

## 2025-04-14 DIAGNOSIS — I10 ESSENTIAL (PRIMARY) HYPERTENSION: Primary | ICD-10-CM

## 2025-04-14 DIAGNOSIS — G45.3 AMAUROSIS FUGAX: ICD-10-CM

## 2025-04-14 DIAGNOSIS — Z00.00 MEDICARE ANNUAL WELLNESS VISIT, SUBSEQUENT: Primary | ICD-10-CM

## 2025-04-14 DIAGNOSIS — Z71.89 ACP (ADVANCE CARE PLANNING): ICD-10-CM

## 2025-04-14 DIAGNOSIS — Z43.6 ATTENTION TO UROSTOMY (HCC): ICD-10-CM

## 2025-04-14 DIAGNOSIS — Z12.5 PROSTATE CANCER SCREENING: ICD-10-CM

## 2025-04-14 DIAGNOSIS — F51.01 PRIMARY INSOMNIA: ICD-10-CM

## 2025-04-14 DIAGNOSIS — J40 BRONCHITIS: ICD-10-CM

## 2025-04-14 DIAGNOSIS — C18.9 MUCINOUS ADENOCARCINOMA OF COLON (HCC): ICD-10-CM

## 2025-04-14 PROCEDURE — 1159F MED LIST DOCD IN RCRD: CPT | Performed by: FAMILY MEDICINE

## 2025-04-14 PROCEDURE — 1126F AMNT PAIN NOTED NONE PRSNT: CPT | Performed by: FAMILY MEDICINE

## 2025-04-14 PROCEDURE — 3079F DIAST BP 80-89 MM HG: CPT | Performed by: FAMILY MEDICINE

## 2025-04-14 PROCEDURE — 3075F SYST BP GE 130 - 139MM HG: CPT | Performed by: FAMILY MEDICINE

## 2025-04-14 PROCEDURE — 3017F COLORECTAL CA SCREEN DOC REV: CPT | Performed by: FAMILY MEDICINE

## 2025-04-14 PROCEDURE — G8427 DOCREV CUR MEDS BY ELIG CLIN: HCPCS | Performed by: FAMILY MEDICINE

## 2025-04-14 PROCEDURE — 99214 OFFICE O/P EST MOD 30 MIN: CPT | Performed by: FAMILY MEDICINE

## 2025-04-14 PROCEDURE — 1160F RVW MEDS BY RX/DR IN RCRD: CPT | Performed by: FAMILY MEDICINE

## 2025-04-14 PROCEDURE — 1036F TOBACCO NON-USER: CPT | Performed by: FAMILY MEDICINE

## 2025-04-14 PROCEDURE — 99497 ADVNCD CARE PLAN 30 MIN: CPT | Performed by: FAMILY MEDICINE

## 2025-04-14 PROCEDURE — G0439 PPPS, SUBSEQ VISIT: HCPCS | Performed by: FAMILY MEDICINE

## 2025-04-14 PROCEDURE — 1125F AMNT PAIN NOTED PAIN PRSNT: CPT | Performed by: FAMILY MEDICINE

## 2025-04-14 PROCEDURE — 1123F ACP DISCUSS/DSCN MKR DOCD: CPT | Performed by: FAMILY MEDICINE

## 2025-04-14 PROCEDURE — G8420 CALC BMI NORM PARAMETERS: HCPCS | Performed by: FAMILY MEDICINE

## 2025-04-14 NOTE — PROGRESS NOTES
SUBJECTIVE  Chief Complaint   Patient presents with    Results     Review of results     Hypertension    IFG    Insomnia    Cough     Ongoing past 3 months      Cough overall 75% better.  Not coughing as much when talking.  Completed Advair.   Still taking a daily Zyrtec.    Patient currently on Eliquis.     Taking trazodone for insomnia.     Taking gabapentin for neuropathy. Was seeing physiatry and going back but seeing a different provider soon, Dr. Weiner.     Insomnia and GERD controlled on medications.     Last A1c was normal despite history of IFG.     Amaurosis fugax -  Patient had suffered two episodes of curtain being drawn sideways with temporary vision loss in 2024.  This was in the right eye when at rest.  He saw his ophthalmologist for a detailed exam and then was sent to the ER.  He was admitted for a stroke work-up.  His work-up was essentially negative but he was placed on ASA and lipitor.  He is tolerating lipitor.     Hypertension -   Patient is taking propranolol without side effects.  No chest pain or dyspnea.    No lightheadedness or dizziness.      Rectal CA -   Says he is cured of cancer and no longer sees gen surg or GI.  Has urostomy and colostomy.  Has follow-up with urology.      OBJECTIVE    Blood pressure 138/84, pulse (!) 101, temperature 98.5 °F (36.9 °C), temperature source Skin, resp. rate 18, height 1.676 m (5' 6\"), weight 56.2 kg (124 lb), SpO2 99%.     General:  Alert, cooperative, well appearing, in no apparent distress.  CV:  The heart sounds are regular in rate and rhythm.  There is a normal S1 and S2.  There or no murmurs.  No carotid bruits.  Lungs:  Inspiratory and expiratory efforts are full and unlabored.  Lung sounds are clear and equal to auscultation throughout all lung fields without wheezing, rales, or rhonchi.  Psych: normal affect.  Mood good.  Oriented x 3.  Judgement and insight intact.      Latest Reference Range & Units 04/02/25 15:00   Sodium 134 - 144

## 2025-04-14 NOTE — PATIENT INSTRUCTIONS
Patient Education        A Healthy Lifestyle: Care Instructions  A healthy lifestyle can help you feel good, have more energy, and stay at a weight that's healthy for you. You can share a healthy lifestyle with your friends and family. And you can do it on your own.    Eat meals with your friends or family. You could try cooking together.   Plan activities with other people. Go for a walk with a friend, try a free online fitness class, or join a sports league.     Eat a variety of healthy foods. These include fruits, vegetables, whole grains, low-fat dairy, and lean protein.   Choose healthy portions of food. You can use the Nutrition Facts label on food packages as a guide.     Eat more fruits and vegetables. You could add vegetables to sandwiches or add fruit to cereal.   Drink water when you are thirsty. Limit soda, juice, and sports drinks.     Try to exercise most days. Aim for at least 2½ hours of exercise each week.   Keep moving. Work in the garden or take your dog on a walk. Use the stairs instead of the elevator.     If you use tobacco or nicotine, try to quit. Ask your doctor about programs and medicines to help you quit.   Limit alcohol. Men should have no more than 2 drinks a day. Women should have no more than 1. For some people, no alcohol is the best choice.   Follow-up care is a key part of your treatment and safety. Be sure to make and go to all appointments, and call your doctor if you are having problems. It's also a good idea to know your test results and keep a list of the medicines you take.  Where can you learn more?  Go to https://www.[employer].net/patientEd and enter U807 to learn more about \"A Healthy Lifestyle: Care Instructions.\"  Current as of: April 30, 2024  Content Version: 14.4  © 0211-5632 [employer] [employer].   Care instructions adapted under license by [employer]. If you have questions about a medical condition or this instruction, always ask your healthcare professional. 
fall precautions

## 2025-04-14 NOTE — ACP (ADVANCE CARE PLANNING)
Advance Care Planning     General Advance Care Planning (ACP) Conversation      Date of Conversation: 4/14/2025  Conducted with: Patient with Decision Making Capacity    Healthcare Decision Maker:     Primary Decision Maker: Virginie Samayoa - Spouse - [phone]    Secondary Decision Maker: Yao RICCIDez COHEN - Child - [phone]    Today we documented Decision Maker(s) consistent with ACP documents on file.    Content/Action Overview:   Has ACP document(s) on file - reflects the patient's care preferences  No changes endorsed.     Length of Voluntary ACP Conversation in minutes:  16 minutes    Itz Mathias MD

## 2025-04-14 NOTE — PROGRESS NOTES
Chief Complaint   Patient presents with    Results     Review of results     Hypertension    IFG    Insomnia    Cough     Ongoing past 3 months        \"Have you been to the ER, urgent care clinic since your last visit?  Hospitalized since your last visit?\"    NO    “Have you seen or consulted any other health care providers outside our system since your last visit?”    YES, under the care of Gastro and oncology for rectal carcinoma       “Have you had a colorectal cancer screening such as a colonoscopy/FIT/Cologuard?    NO - ostomy     Date of last Colonoscopy: 4/21/2020  No cologuard on file  No FIT/FOBT on file   Date of last flexible sigmoidoscopy: 4/21/2020

## 2025-04-14 NOTE — PROGRESS NOTES
Chief Complaint   Patient presents with    Medicare AWV Medicare Annual Wellness Visit    Dez Samayoa is here for Medicare AWV    Assessment & Plan   Medicare annual wellness visit, subsequent  ACP (advance care planning)  -     [place] Advanced Care Planning (16-30 minutes) [60418]     ACP reviewed in depth. See ACP notes.  AWV annually.       Subjective       Patient's complete Health Risk Assessment and screening values have been reviewed and are found in Flowsheets. The following problems were reviewed today and where indicated follow up appointments were made and/or referrals ordered.    Positive Risk Factor Screenings with Interventions:        Controlled medication screen positive-  managed by oncology.               Vision Screen:  Do you have difficulty driving, watching TV, or doing any of your daily activities because of your eyesight?: (Patient-Rptd) No  Have you had an eye exam within the past year?: (!) (Patient-Rptd) No  Interventions:   Patient encouraged to make appointment with their eye specialist                 Objective   Vitals:    04/14/25 1056   BP: 138/84   BP Site: Left Upper Arm   Patient Position: Sitting   BP Cuff Size: Large Adult   Pulse: (!) 101   Resp: 18   Temp: 98.5 °F (36.9 °C)   TempSrc: Skin   SpO2: 99%   Weight: 56.2 kg (124 lb)   Height: 1.676 m (5' 6\")      Body mass index is 20.01 kg/m².                Allergies   Allergen Reactions    Hydromorphone Hives, Itching and Rash     Itching    Iodinated Contrast Media Nausea And Vomiting     Betadine okay.     Prior to Visit Medications    Medication Sig Taking? Authorizing Provider   propranolol (INDERAL LA) 60 MG extended release capsule TAKE 1 CAPSULE BY MOUTH DAILY Yes Itz Mathias MD   gabapentin (NEURONTIN) 600 MG tablet TAKE 1 TABLET BY MOUTH 3 TIMES A DAY . MAX DAILY AMOUNT :1,800 MILLIGRAMS Yes Theresa Guillermo, APRN - NP   cyanocobalamin 500 MCG tablet Take 1 tablet by mouth daily Yes Provider, MD Nuvia

## 2025-05-07 NOTE — PROGRESS NOTES
[facility]  [facility]  [place] [address]  Phone: [phone]  Fax: [phone]    Patient's YOB: 1955    ASSESSMENT   Dez was seen today for back pain.    Diagnoses and all orders for this visit:    Sacral pain  -     MRI PELVIS WO CONTRAST; Future  -     Cancel: XR PELVIS (1-2 VIEWS); Future  -     [29748] Sacrum/coccyx    Numbness and tingling of left lower extremity  -     MRI PELVIS WO CONTRAST; Future  -     Cancel: XR PELVIS (1-2 VIEWS); Future    Lumbosacral neuritis    Lumbar radiculopathy    Left leg weakness    History of rectal cancer    Anemia, unspecified type    Chronic anticoagulation         IMPRESSION AND PLAN:  Dez Samayoa is a 69 y.o. male with history of Crohn's disease, GERD, TIA, and HTN. Pt complains of sacral pain. Patient is currently taking Gabapentin 600mg (600/600/600). Pt underwent Ganglion of Ángel (Impar) Block (10/22/24, Dr. Dillard) with minimal benefit. Patient utilizes Vitamin D supplements.      Patient was given information on sacroiliac exercises.   Discussed treatment options with the patient including neuropathic medications.   Patient was educated on the benefits of intermittent fasting.   Reviewed Lumbar Spine MRI W WO Contrast images from 7/26/24 with patient.   Reviewed Pelvis MRI W WO Contrast images from 12/23/20 with patient.  Reviewed Chest/ABD/Pelvis MRI W WO Contrast images from 5/15/25 with patient.  Ordered Sacrum/Coccyx XR for assessment of sacral pain. To be read by Ortho.   Ordered Pelvis MRI WO Contrast for surgical and injection evaluation.   Mr. Samayoa has a reminder for a \"due or due soon\" health maintenance. I have asked that he contact his primary care provider, Itz Mathias MD, for follow-up on this health maintenance.   demonstrated consistency with prescribing.   Patient is taking Eliquis and is not a candidate for NSAIDs. He also endorses renal disease.

## 2025-05-19 ENCOUNTER — OFFICE VISIT (OUTPATIENT)
Age: 70
End: 2025-05-19
Payer: MEDICARE

## 2025-05-19 VITALS
HEIGHT: 66 IN | HEART RATE: 83 BPM | DIASTOLIC BLOOD PRESSURE: 87 MMHG | BODY MASS INDEX: 20.89 KG/M2 | SYSTOLIC BLOOD PRESSURE: 128 MMHG | WEIGHT: 130 LBS | OXYGEN SATURATION: 95 % | TEMPERATURE: 97.9 F

## 2025-05-19 DIAGNOSIS — R29.898 LEFT LEG WEAKNESS: ICD-10-CM

## 2025-05-19 DIAGNOSIS — D64.9 ANEMIA, UNSPECIFIED TYPE: ICD-10-CM

## 2025-05-19 DIAGNOSIS — M54.17 LUMBOSACRAL NEURITIS: ICD-10-CM

## 2025-05-19 DIAGNOSIS — M53.3 SACRAL PAIN: Primary | ICD-10-CM

## 2025-05-19 DIAGNOSIS — M54.16 LUMBAR RADICULOPATHY: ICD-10-CM

## 2025-05-19 DIAGNOSIS — R20.0 NUMBNESS AND TINGLING OF LEFT LOWER EXTREMITY: ICD-10-CM

## 2025-05-19 DIAGNOSIS — Z79.01 CHRONIC ANTICOAGULATION: ICD-10-CM

## 2025-05-19 DIAGNOSIS — Z85.048 HISTORY OF RECTAL CANCER: ICD-10-CM

## 2025-05-19 DIAGNOSIS — R20.2 NUMBNESS AND TINGLING OF LEFT LOWER EXTREMITY: ICD-10-CM

## 2025-05-19 PROCEDURE — 3074F SYST BP LT 130 MM HG: CPT | Performed by: PHYSICAL MEDICINE & REHABILITATION

## 2025-05-19 PROCEDURE — 1160F RVW MEDS BY RX/DR IN RCRD: CPT | Performed by: PHYSICAL MEDICINE & REHABILITATION

## 2025-05-19 PROCEDURE — G8427 DOCREV CUR MEDS BY ELIG CLIN: HCPCS | Performed by: PHYSICAL MEDICINE & REHABILITATION

## 2025-05-19 PROCEDURE — 1036F TOBACCO NON-USER: CPT | Performed by: PHYSICAL MEDICINE & REHABILITATION

## 2025-05-19 PROCEDURE — 1159F MED LIST DOCD IN RCRD: CPT | Performed by: PHYSICAL MEDICINE & REHABILITATION

## 2025-05-19 PROCEDURE — 72220 X-RAY EXAM SACRUM TAILBONE: CPT | Performed by: PHYSICAL MEDICINE & REHABILITATION

## 2025-05-19 PROCEDURE — 3079F DIAST BP 80-89 MM HG: CPT | Performed by: PHYSICAL MEDICINE & REHABILITATION

## 2025-05-19 PROCEDURE — 99215 OFFICE O/P EST HI 40 MIN: CPT | Performed by: PHYSICAL MEDICINE & REHABILITATION

## 2025-05-19 PROCEDURE — G8420 CALC BMI NORM PARAMETERS: HCPCS | Performed by: PHYSICAL MEDICINE & REHABILITATION

## 2025-05-19 PROCEDURE — 3017F COLORECTAL CA SCREEN DOC REV: CPT | Performed by: PHYSICAL MEDICINE & REHABILITATION

## 2025-05-19 PROCEDURE — 1123F ACP DISCUSS/DSCN MKR DOCD: CPT | Performed by: PHYSICAL MEDICINE & REHABILITATION

## 2025-05-19 PROCEDURE — 1125F AMNT PAIN NOTED PAIN PRSNT: CPT | Performed by: PHYSICAL MEDICINE & REHABILITATION

## 2025-05-19 RX ORDER — PREGABALIN 50 MG/1
CAPSULE ORAL
Qty: 90 CAPSULE | Refills: 2 | Status: CANCELLED | OUTPATIENT
Start: 2025-05-19 | End: 2025-07-19

## 2025-05-19 NOTE — PROGRESS NOTES
Dez Samayoa presents today for   Chief Complaint   Patient presents with    Back Pain     LBP LLE numbness to foot       Is someone accompanying this pt? Yes, wife    Is the patient using any DME equipment during OV? no      Coordination of Care:  1. Have you been to the ER, urgent care clinic since your last visit? no  Hospitalized since your last visit? no    2. Have you seen or consulted any other health care providers outside of the [facility] since your last visit? no Include any pap smears or colon screening. no

## 2025-06-02 RX ORDER — TRAZODONE HYDROCHLORIDE 100 MG/1
100 TABLET ORAL NIGHTLY
Qty: 90 TABLET | Refills: 1 | Status: SHIPPED | OUTPATIENT
Start: 2025-06-02

## 2025-06-25 RX ORDER — ASPIRIN 81 MG
TABLET,CHEWABLE ORAL
Qty: 90 TABLET | Refills: 3 | Status: SHIPPED | OUTPATIENT
Start: 2025-06-25

## 2025-06-26 RX ORDER — PROPRANOLOL HYDROCHLORIDE 60 MG/1
60 CAPSULE, EXTENDED RELEASE ORAL DAILY
Qty: 90 CAPSULE | Refills: 1 | Status: SHIPPED | OUTPATIENT
Start: 2025-06-26

## 2025-07-14 ENCOUNTER — OFFICE VISIT (OUTPATIENT)
Age: 70
End: 2025-07-14
Payer: MEDICARE

## 2025-07-14 VITALS
HEART RATE: 69 BPM | TEMPERATURE: 98.3 F | HEIGHT: 66 IN | SYSTOLIC BLOOD PRESSURE: 124 MMHG | OXYGEN SATURATION: 98 % | RESPIRATION RATE: 18 BRPM | WEIGHT: 135.6 LBS | DIASTOLIC BLOOD PRESSURE: 78 MMHG | BODY MASS INDEX: 21.79 KG/M2

## 2025-07-14 DIAGNOSIS — M53.3 SACRAL PAIN: Primary | ICD-10-CM

## 2025-07-14 DIAGNOSIS — M53.3 COCCYGODYNIA: ICD-10-CM

## 2025-07-14 DIAGNOSIS — M54.16 LUMBAR RADICULOPATHY: ICD-10-CM

## 2025-07-14 DIAGNOSIS — Z85.048 HISTORY OF RECTAL CANCER: ICD-10-CM

## 2025-07-14 DIAGNOSIS — M60.9 MYOSITIS, UNSPECIFIED MYOSITIS TYPE, UNSPECIFIED SITE: ICD-10-CM

## 2025-07-14 PROCEDURE — G8427 DOCREV CUR MEDS BY ELIG CLIN: HCPCS | Performed by: PHYSICAL MEDICINE & REHABILITATION

## 2025-07-14 PROCEDURE — 1036F TOBACCO NON-USER: CPT | Performed by: PHYSICAL MEDICINE & REHABILITATION

## 2025-07-14 PROCEDURE — G8420 CALC BMI NORM PARAMETERS: HCPCS | Performed by: PHYSICAL MEDICINE & REHABILITATION

## 2025-07-14 PROCEDURE — 99214 OFFICE O/P EST MOD 30 MIN: CPT | Performed by: PHYSICAL MEDICINE & REHABILITATION

## 2025-07-14 PROCEDURE — 3074F SYST BP LT 130 MM HG: CPT | Performed by: PHYSICAL MEDICINE & REHABILITATION

## 2025-07-14 PROCEDURE — 1160F RVW MEDS BY RX/DR IN RCRD: CPT | Performed by: PHYSICAL MEDICINE & REHABILITATION

## 2025-07-14 PROCEDURE — 3078F DIAST BP <80 MM HG: CPT | Performed by: PHYSICAL MEDICINE & REHABILITATION

## 2025-07-14 PROCEDURE — 3017F COLORECTAL CA SCREEN DOC REV: CPT | Performed by: PHYSICAL MEDICINE & REHABILITATION

## 2025-07-14 PROCEDURE — 1159F MED LIST DOCD IN RCRD: CPT | Performed by: PHYSICAL MEDICINE & REHABILITATION

## 2025-07-14 PROCEDURE — 1125F AMNT PAIN NOTED PAIN PRSNT: CPT | Performed by: PHYSICAL MEDICINE & REHABILITATION

## 2025-07-14 PROCEDURE — 1123F ACP DISCUSS/DSCN MKR DOCD: CPT | Performed by: PHYSICAL MEDICINE & REHABILITATION

## 2025-07-14 RX ORDER — PREGABALIN 100 MG/1
CAPSULE ORAL
Qty: 90 CAPSULE | Refills: 2 | Status: SHIPPED | OUTPATIENT
Start: 2025-07-14 | End: 2025-10-14

## 2025-07-14 ASSESSMENT — PATIENT HEALTH QUESTIONNAIRE - PHQ9
SUM OF ALL RESPONSES TO PHQ QUESTIONS 1-9: 0
1. LITTLE INTEREST OR PLEASURE IN DOING THINGS: NOT AT ALL
SUM OF ALL RESPONSES TO PHQ QUESTIONS 1-9: 0
2. FEELING DOWN, DEPRESSED OR HOPELESS: NOT AT ALL

## 2025-07-14 NOTE — PROGRESS NOTES
Dez Samayoa presents today for   Chief Complaint   Patient presents with    Pain    Tailbone Pain       Is someone accompanying this pt? yes    Is the patient using any DME equipment during OV? no    Depression Screening:       No data to display                Learning Assessment:  Failed to redirect to the Timeline version of the [employer] Smart[employer].    Abuse Screening:       No data to display                Fall Risk  Failed to redirect to the Timeline version of the [employer] Smart[employer].    OPIOID RISK TOOL  Failed to redirect to the Timeline version of the [employer] Smart[employer].    Coordination of Care:  1. Have you been to the ER, urgent care clinic since your last visit? no  Hospitalized since your last visit? no    2. Have you seen or consulted any other health care providers outside of the [facility] since your last visit? no Include any pap smears or colon screening. no

## 2025-07-14 NOTE — PROGRESS NOTES
[facility]  [address] [place] [address]., [address]  [place] [address]  Phone: [phone]  Fax: [phone]    Patient's YOB: 1955    ASSESSMENT   Dez was seen today for pain and tailbone pain.    Diagnoses and all orders for this visit:    Sacral pain    Myositis, unspecified myositis type, unspecified site    Coccygodynia  -     Amb External Referral To Pain Medicine    Lumbar radiculopathy  -     pregabalin (LYRICA) 100 MG capsule; Taper up to 1 po tid for neuropathic symptoms    History of rectal cancer         IMPRESSION AND PLAN:  Dez Samayoa is a 69 y.o. male with history of  Crohn's disease, GERD, TIA, and HTN. Pt complains of sacral pain.  Since last visit, pt feels his symptoms have improved. Patient is currently taking Lyrica 50mg (50/100). Pt underwent Ganglion of Ángel (Impar) Block (10/22/24, Dr. Dillard) with no benefit. Patient utilizes Vitamin D supplements.      Patient was given information on sacroiliac exercises.   Reviewed Pelvis MRI WO Contrast images from 7/7/25 with patient.   Reviewed ABD/Pelvis CT findings from 5/15/25 with patient.   Rx Lyrica 50mg (100/100/100) for neuropathic symptoms.  Increased from 50mg (50/100) for greater management of neuropathic symptoms.   Mr. Samayoa has a reminder for a \"due or due soon\" health maintenance. I have asked that he contact his primary care provider, Itz Mathias MD, for follow-up on this health maintenance.   demonstrated consistency with prescribing.   Referral to Pain Medicine - lumbar epidural steroid and coccyx block evaluation.   Recommended OTC topical medications such as Aspercaine.     Return in about 2 months (around 9/14/2025) for Medication follow up.      HISTORY OF PRESENT ILLNESS:  Dez Samayoa is a 69 y.o. RHD male who presents to the office today for a diagnostic follow up. At his last OV (5/19/25), Ordered Sacrum/Coccyx XR and Pelvis MRI WO Contrast.    Patient

## 2025-08-07 RX ORDER — ATORVASTATIN CALCIUM 40 MG/1
40 TABLET, FILM COATED ORAL NIGHTLY
Qty: 90 TABLET | Refills: 0 | Status: SHIPPED | OUTPATIENT
Start: 2025-08-07

## 2025-08-29 RX ORDER — OMEPRAZOLE 20 MG/1
20 CAPSULE, DELAYED RELEASE ORAL DAILY
Qty: 90 CAPSULE | Refills: 3 | Status: SHIPPED | OUTPATIENT
Start: 2025-08-29